# Patient Record
Sex: FEMALE | Race: WHITE | NOT HISPANIC OR LATINO | URBAN - METROPOLITAN AREA
[De-identification: names, ages, dates, MRNs, and addresses within clinical notes are randomized per-mention and may not be internally consistent; named-entity substitution may affect disease eponyms.]

---

## 2022-10-11 ENCOUNTER — ANESTHESIA EVENT (OUTPATIENT)
Dept: SURGERY | Facility: OTHER | Age: 82
End: 2022-10-11
Payer: MEDICARE

## 2022-10-11 ENCOUNTER — HOSPITAL ENCOUNTER (OUTPATIENT)
Dept: PREADMISSION TESTING | Facility: OTHER | Age: 82
Discharge: HOME | End: 2022-10-11
Attending: ORTHOPAEDIC SURGERY
Payer: MEDICARE

## 2022-10-11 VITALS
SYSTOLIC BLOOD PRESSURE: 149 MMHG | TEMPERATURE: 98 F | WEIGHT: 194 LBS | BODY MASS INDEX: 35.7 KG/M2 | RESPIRATION RATE: 20 BRPM | DIASTOLIC BLOOD PRESSURE: 73 MMHG | OXYGEN SATURATION: 94 % | HEIGHT: 62 IN | HEART RATE: 79 BPM

## 2022-10-11 DIAGNOSIS — Z01.818 PREOP TESTING: Primary | ICD-10-CM

## 2022-10-11 PROCEDURE — 93010 EKG 12-LEAD: ICD-10-PCS | Mod: ,,, | Performed by: INTERNAL MEDICINE

## 2022-10-11 PROCEDURE — 93005 ELECTROCARDIOGRAM TRACING: CPT

## 2022-10-11 PROCEDURE — 93010 ELECTROCARDIOGRAM REPORT: CPT | Mod: ,,, | Performed by: INTERNAL MEDICINE

## 2022-10-11 RX ORDER — ESCITALOPRAM OXALATE 20 MG/1
TABLET ORAL
COMMUNITY
Start: 2022-10-04

## 2022-10-11 RX ORDER — TRAMADOL HYDROCHLORIDE 50 MG/1
50 TABLET ORAL
Status: ON HOLD | COMMUNITY
Start: 2022-10-06 | End: 2022-10-19 | Stop reason: HOSPADM

## 2022-10-11 RX ORDER — LEVOTHYROXINE SODIUM 75 UG/1
TABLET ORAL
COMMUNITY
Start: 2022-09-27

## 2022-10-11 RX ORDER — MELOXICAM 15 MG/1
15 TABLET ORAL DAILY PRN
COMMUNITY
Start: 2022-09-12

## 2022-10-11 RX ORDER — TEMAZEPAM 15 MG/1
CAPSULE ORAL
COMMUNITY
Start: 2022-09-13

## 2022-10-11 RX ORDER — LOSARTAN POTASSIUM 50 MG/1
50 TABLET ORAL DAILY
COMMUNITY
Start: 2022-09-27

## 2022-10-11 RX ORDER — ATORVASTATIN CALCIUM 10 MG/1
10 TABLET, FILM COATED ORAL DAILY
COMMUNITY
Start: 2022-10-04

## 2022-10-11 NOTE — DISCHARGE INSTRUCTIONS
Information to Prepare you for your Surgery    PRE-ADMIT TESTING -  [phone]    [address] [facility]          Your surgery has been scheduled at [facility]. We are pleased to have the opportunity to serve you. For Further Information please call [phone].    On the day of surgery please report to the Information Desk on the 1st floor.    CONTACT YOUR PHYSICIAN'S OFFICE THE DAY PRIOR TO YOUR SURGERY TO OBTAIN YOUR ARRIVAL TIME.     The evening before surgery do not eat anything after 9 p.m. ( this includes hard candy, chewing gum and mints).  You may only have GATORADE, POWERADE AND WATER  from 9 p.m. until you leave your home.   DO NOT DRINK ANY LIQUIDS ON THE WAY TO THE HOSPITAL.      Why does your anesthesiologist allow you to drink Gatorade/Powerade before surgery?  Gatorade/Powerade helps to increase your comfort before surgery and to decrease your nausea after surgery. The carbohydrates in Gatorade/Powerade help reduce your body's stress response to surgery.  If you are a diabetic-drink only water prior to surgery.      Current Visitor policy(12/27/2021) - Patients may have 2 visitors pre and post procedure. Only 2 visitors will be allowed in the Surgical building with the patient.     SPECIAL MEDICATION INSTRUCTIONS: TAKE medications checked off by the Anesthesiologist on your Medication List.    Angiogram Patients: Take medications as instructed by your physician, including aspirin.     Surgery Patients:    If you take ASPIRIN - Your PHYSICIAN/SURGEON will need to inform you IF/OR when you need to stop taking aspirin prior to your surgery.     Do Not take any medications containing IBUPROFEN.    Do Not Wear any make-up (especially eye make-up) to surgery. Please remove any false eyelashes or eyelash extensions. If you arrive the day of surgery with makeup/eyelashes on you will be required to remove prior to surgery. (There is a risk of corneal  abrasions if eye makeup/eyelash extensions are not removed)      Leave all valuables at home.   Do Not wear any jewelry or watches, including any metal in body piercings. Jewelry must be removed prior to coming to the hospital.  There is a possibility that rings that are unable to be removed may be cut off if they are on the surgical extremity.    Please remove all hair extensions, wigs, clips and any other metal accessories/ ornaments from your hair.  These items may pose a flammable/fire risk in Surgery and must be removed.    Do not shave your surgical area at least 5 days prior to your surgery. The surgical prep will be performed at the hospital according to Infection Control regulations.    Contact Lens must be removed before surgery. Either do not wear the contact lens or bring a case and solution for storage.  Please bring a container for eyeglasses or dentures as required.  Bring any paperwork your physician has provided, such as consent forms,  history and physicals, doctor's orders, etc.   Bring comfortable clothes that are loose fitting to wear upon discharge. Take into consideration the type of surgery being performed.  Maintain your diet as advised per your physician the day prior to surgery.      Adequate rest the night before surgery is advised.   Park in the Parking lot behind the hospital or in the [employer] [facility] across the street from the parking lot. Parking is complimentary.  If you will be discharged the same day as your procedure, please arrange for a responsible adult to drive you home or to accompany you if traveling by taxi.   YOU WILL NOT BE PERMITTED TO DRIVE OR TO LEAVE THE HOSPITAL ALONE AFTER SURGERY.   If you are being discharged the same day, it is strongly recommended that you arrange for someone to remain with you for the first 24 hrs following your surgery.    The Surgeon will speak to your family/visitor after your surgery regarding the outcome of your surgery and post op  care.  The Surgeon may speak to you after your surgery, but there is a possibility you may not remember the details.  Please check with your family members regarding the conversation with the Surgeon.    We strongly recommend whoever is bringing you home be present for discharge instructions.  This will ensure a thorough understanding for your post op home care.    ALL CHILDREN MUST ALWAYS BE ACCOMPANIED BY AN ADULT.    Visitors-Refer to current Visitor policy handouts.    Thank you for your cooperation.  The Staff of [facility].            Bathing Instructions with Hibiclens    Shower the evening before and morning of your procedure with Hibiclens:  Wash your face with water and your regular face wash/soap  Apply Hibiclens directly on your skin or on a wet washcloth and wash gently. When showering: Move away from the shower stream when applying Hibiclens to avoid rinsing off too soon.  Rinse thoroughly with warm water  Do not dilute Hibiclens        Dry off as usual, do not use any deodorant, powder, body lotions, perfume, after shave or cologne.

## 2022-10-11 NOTE — ANESTHESIA PREPROCEDURE EVALUATION
10/11/2022  Sun Jones is a 82 y.o., female.      Pre-op Assessment    I have reviewed the Patient Summary Reports.     I have reviewed the Nursing Notes. I have reviewed the NPO Status.   I have reviewed the Medications.     Review of Systems  Anesthesia Hx:  Denies Family Hx of Anesthesia complications.   Denies Personal Hx of Anesthesia complications.   Social:  Non-Smoker    Hematology/Oncology:     Oncology Normal    -- Anemia:   EENT/Dental:EENT/Dental Normal   Cardiovascular:   Hypertension hyperlipidemia    Pulmonary:  Pulmonary Normal    Renal/:   Chronic Renal Disease, CKD    Hepatic/GI:  Hepatic/GI Normal    Musculoskeletal:   Arthritis     Neurological:   Frequent falls, trips not syncope   Endocrine:   Hypothyroidism  Obesity / BMI > 30  Dermatological:  Skin Normal    Psych:  Psychiatric Normal           Physical Exam  General: Cooperative, Alert and Oriented    Airway:  Mallampati: III   Mouth Opening: Normal  TM Distance: Normal  Neck ROM: Normal ROM    Dental:A lot of dental issues, healing post upper L, temp bridge lower L, multiple missing teeth, none loose, upper front perm bridge      Anesthesia Plan  Type of Anesthesia, risks & benefits discussed:    Anesthesia Type: Gen ETT  Intra-op Monitoring Plan: Standard ASA Monitors  Post Op Pain Control Plan: multimodal analgesia, IV/PO Opioids PRN and peripheral nerve block  Induction:  IV  Airway Plan: Video  Informed Consent: Informed consent signed with the Patient and all parties understand the risks and agree with anesthesia plan.  All questions answered.   ASA Score: 3  Anesthesia Plan Notes: Labs on paper chart from [facility] notable for eGFR 47, hb 10.7  EKG today    Ready For Surgery From Anesthesia Perspective.     .

## 2022-10-18 ENCOUNTER — HOSPITAL ENCOUNTER (OUTPATIENT)
Facility: OTHER | Age: 82
Discharge: HOME HEALTH | End: 2022-10-19
Attending: ORTHOPAEDIC SURGERY | Admitting: ORTHOPAEDIC SURGERY
Payer: MEDICARE

## 2022-10-18 ENCOUNTER — ANESTHESIA (OUTPATIENT)
Dept: SURGERY | Facility: OTHER | Age: 82
End: 2022-10-18
Payer: MEDICARE

## 2022-10-18 DIAGNOSIS — S42.92XA FRACTURE, SHOULDER, LEFT, CLOSED, INITIAL ENCOUNTER: ICD-10-CM

## 2022-10-18 DIAGNOSIS — M19.012 PRIMARY OSTEOARTHRITIS OF LEFT SHOULDER: Primary | ICD-10-CM

## 2022-10-18 LAB
ABO + RH BLD: NORMAL
BASOPHILS # BLD AUTO: 0.02 K/UL (ref 0–0.2)
BASOPHILS NFR BLD: 0.3 % (ref 0–1.9)
BLD GP AB SCN CELLS X3 SERPL QL: NORMAL
BLD PROD TYP BPU: NORMAL
BLOOD UNIT EXPIRATION DATE: NORMAL
BLOOD UNIT TYPE CODE: 6200
BLOOD UNIT TYPE: NORMAL
CODING SYSTEM: NORMAL
DIFFERENTIAL METHOD: ABNORMAL
DISPENSE STATUS: NORMAL
EOSINOPHIL # BLD AUTO: 0.1 K/UL (ref 0–0.5)
EOSINOPHIL NFR BLD: 1.9 % (ref 0–8)
ERYTHROCYTE [DISTWIDTH] IN BLOOD BY AUTOMATED COUNT: 16.2 % (ref 11.5–14.5)
HCT VFR BLD AUTO: 30.8 % (ref 37–48.5)
HGB BLD-MCNC: 10.2 G/DL (ref 12–16)
IMM GRANULOCYTES # BLD AUTO: 0.02 K/UL (ref 0–0.04)
IMM GRANULOCYTES NFR BLD AUTO: 0.3 % (ref 0–0.5)
LYMPHOCYTES # BLD AUTO: 1.2 K/UL (ref 1–4.8)
LYMPHOCYTES NFR BLD: 17.6 % (ref 18–48)
MCH RBC QN AUTO: 29.6 PG (ref 27–31)
MCHC RBC AUTO-ENTMCNC: 33.1 G/DL (ref 32–36)
MCV RBC AUTO: 89 FL (ref 82–98)
MONOCYTES # BLD AUTO: 0.5 K/UL (ref 0.3–1)
MONOCYTES NFR BLD: 7.1 % (ref 4–15)
NEUTROPHILS # BLD AUTO: 5 K/UL (ref 1.8–7.7)
NEUTROPHILS NFR BLD: 72.8 % (ref 38–73)
NRBC BLD-RTO: 0 /100 WBC
NUM UNITS TRANS PACKED RBC: NORMAL
PLATELET # BLD AUTO: 241 K/UL (ref 150–450)
PMV BLD AUTO: 9.1 FL (ref 9.2–12.9)
RBC # BLD AUTO: 3.45 M/UL (ref 4–5.4)
WBC # BLD AUTO: 6.86 K/UL (ref 3.9–12.7)

## 2022-10-18 PROCEDURE — P9045 ALBUMIN (HUMAN), 5%, 250 ML: HCPCS | Mod: JG | Performed by: STUDENT IN AN ORGANIZED HEALTH CARE EDUCATION/TRAINING PROGRAM

## 2022-10-18 PROCEDURE — 94799 UNLISTED PULMONARY SVC/PX: CPT

## 2022-10-18 PROCEDURE — 86901 BLOOD TYPING SEROLOGIC RH(D): CPT | Performed by: ORTHOPAEDIC SURGERY

## 2022-10-18 PROCEDURE — 97535 SELF CARE MNGMENT TRAINING: CPT

## 2022-10-18 PROCEDURE — 37000008 HC ANESTHESIA 1ST 15 MINUTES: Performed by: ORTHOPAEDIC SURGERY

## 2022-10-18 PROCEDURE — 27201423 OPTIME MED/SURG SUP & DEVICES STERILE SUPPLY: Performed by: ORTHOPAEDIC SURGERY

## 2022-10-18 PROCEDURE — 63600175 PHARM REV CODE 636 W HCPCS: Performed by: ANESTHESIOLOGY

## 2022-10-18 PROCEDURE — 63600175 PHARM REV CODE 636 W HCPCS: Performed by: ORTHOPAEDIC SURGERY

## 2022-10-18 PROCEDURE — 25000003 PHARM REV CODE 250: Performed by: ORTHOPAEDIC SURGERY

## 2022-10-18 PROCEDURE — 36000711: Performed by: ORTHOPAEDIC SURGERY

## 2022-10-18 PROCEDURE — 25000003 PHARM REV CODE 250: Performed by: ANESTHESIOLOGY

## 2022-10-18 PROCEDURE — 76942 ECHO GUIDE FOR BIOPSY: CPT | Performed by: ANESTHESIOLOGY

## 2022-10-18 PROCEDURE — 37000009 HC ANESTHESIA EA ADD 15 MINS: Performed by: ORTHOPAEDIC SURGERY

## 2022-10-18 PROCEDURE — 71000039 HC RECOVERY, EACH ADD'L HOUR: Performed by: ORTHOPAEDIC SURGERY

## 2022-10-18 PROCEDURE — 97165 OT EVAL LOW COMPLEX 30 MIN: CPT

## 2022-10-18 PROCEDURE — 71000033 HC RECOVERY, INTIAL HOUR: Performed by: ORTHOPAEDIC SURGERY

## 2022-10-18 PROCEDURE — P9016 RBC LEUKOCYTES REDUCED: HCPCS | Performed by: ANESTHESIOLOGY

## 2022-10-18 PROCEDURE — 36000710: Performed by: ORTHOPAEDIC SURGERY

## 2022-10-18 PROCEDURE — 94761 N-INVAS EAR/PLS OXIMETRY MLT: CPT | Mod: 59

## 2022-10-18 PROCEDURE — 97110 THERAPEUTIC EXERCISES: CPT

## 2022-10-18 PROCEDURE — 25000003 PHARM REV CODE 250: Performed by: STUDENT IN AN ORGANIZED HEALTH CARE EDUCATION/TRAINING PROGRAM

## 2022-10-18 PROCEDURE — C1729 CATH, DRAINAGE: HCPCS | Performed by: ORTHOPAEDIC SURGERY

## 2022-10-18 PROCEDURE — 86920 COMPATIBILITY TEST SPIN: CPT | Performed by: ANESTHESIOLOGY

## 2022-10-18 PROCEDURE — 99900035 HC TECH TIME PER 15 MIN (STAT)

## 2022-10-18 PROCEDURE — C1776 JOINT DEVICE (IMPLANTABLE): HCPCS | Performed by: ORTHOPAEDIC SURGERY

## 2022-10-18 PROCEDURE — C9290 INJ, BUPIVACAINE LIPOSOME: HCPCS | Performed by: ORTHOPAEDIC SURGERY

## 2022-10-18 PROCEDURE — 63600175 PHARM REV CODE 636 W HCPCS: Performed by: STUDENT IN AN ORGANIZED HEALTH CARE EDUCATION/TRAINING PROGRAM

## 2022-10-18 PROCEDURE — 85025 COMPLETE CBC W/AUTO DIFF WBC: CPT | Performed by: ORTHOPAEDIC SURGERY

## 2022-10-18 PROCEDURE — 36430 TRANSFUSION BLD/BLD COMPNT: CPT | Mod: 59

## 2022-10-18 PROCEDURE — C1713 ANCHOR/SCREW BN/BN,TIS/BN: HCPCS | Performed by: ORTHOPAEDIC SURGERY

## 2022-10-18 PROCEDURE — 36415 COLL VENOUS BLD VENIPUNCTURE: CPT | Performed by: ORTHOPAEDIC SURGERY

## 2022-10-18 DEVICE — SCREW BONE RSP 5 X 22MM TITAN: Type: IMPLANTABLE DEVICE | Site: SHOULDER | Status: FUNCTIONAL

## 2022-10-18 DEVICE — IMPLANTABLE DEVICE: Type: IMPLANTABLE DEVICE | Site: SHOULDER | Status: FUNCTIONAL

## 2022-10-18 DEVICE — HEAD GLENOID RSP 4MM OFFSET: Type: IMPLANTABLE DEVICE | Site: SHOULDER | Status: FUNCTIONAL

## 2022-10-18 DEVICE — SCREW BONE RSP 6.5X18 GLENOID: Type: IMPLANTABLE DEVICE | Site: SHOULDER | Status: FUNCTIONAL

## 2022-10-18 DEVICE — SCREW BONE LOCK 5X30MM TI: Type: IMPLANTABLE DEVICE | Site: SHOULDER | Status: FUNCTIONAL

## 2022-10-18 DEVICE — INSERT SOCKET E+ SM +4 SZ32: Type: IMPLANTABLE DEVICE | Site: SHOULDER | Status: FUNCTIONAL

## 2022-10-18 DEVICE — SCREW RSP GLENOID BP 5X14MM: Type: IMPLANTABLE DEVICE | Site: SHOULDER | Status: FUNCTIONAL

## 2022-10-18 DEVICE — BASEPLATE GLENOID REV RSP P2: Type: IMPLANTABLE DEVICE | Site: SHOULDER | Status: FUNCTIONAL

## 2022-10-18 RX ORDER — FENTANYL CITRATE 50 UG/ML
INJECTION, SOLUTION INTRAMUSCULAR; INTRAVENOUS
Status: DISCONTINUED | OUTPATIENT
Start: 2022-10-18 | End: 2022-10-18

## 2022-10-18 RX ORDER — DEXAMETHASONE SODIUM PHOSPHATE 4 MG/ML
INJECTION, SOLUTION INTRA-ARTICULAR; INTRALESIONAL; INTRAMUSCULAR; INTRAVENOUS; SOFT TISSUE
Status: DISCONTINUED | OUTPATIENT
Start: 2022-10-18 | End: 2022-10-18

## 2022-10-18 RX ORDER — ONDANSETRON 2 MG/ML
INJECTION INTRAMUSCULAR; INTRAVENOUS
Status: DISCONTINUED | OUTPATIENT
Start: 2022-10-18 | End: 2022-10-18

## 2022-10-18 RX ORDER — LOSARTAN POTASSIUM 50 MG/1
50 TABLET ORAL DAILY
Status: DISCONTINUED | OUTPATIENT
Start: 2022-10-19 | End: 2022-10-19

## 2022-10-18 RX ORDER — ACETAMINOPHEN 500 MG
1000 TABLET ORAL EVERY 6 HOURS
Status: DISCONTINUED | OUTPATIENT
Start: 2022-10-18 | End: 2022-10-19 | Stop reason: HOSPADM

## 2022-10-18 RX ORDER — ACETAMINOPHEN 500 MG
1000 TABLET ORAL
Status: COMPLETED | OUTPATIENT
Start: 2022-10-18 | End: 2022-10-18

## 2022-10-18 RX ORDER — MUPIROCIN 20 MG/G
OINTMENT TOPICAL
Status: DISCONTINUED | OUTPATIENT
Start: 2022-10-18 | End: 2022-10-18 | Stop reason: HOSPADM

## 2022-10-18 RX ORDER — LIDOCAINE HYDROCHLORIDE 20 MG/ML
INJECTION INTRAVENOUS
Status: DISCONTINUED | OUTPATIENT
Start: 2022-10-18 | End: 2022-10-18

## 2022-10-18 RX ORDER — FAMOTIDINE 20 MG/1
20 TABLET, FILM COATED ORAL 2 TIMES DAILY
Status: DISCONTINUED | OUTPATIENT
Start: 2022-10-18 | End: 2022-10-19 | Stop reason: HOSPADM

## 2022-10-18 RX ORDER — HYDROMORPHONE HYDROCHLORIDE 2 MG/ML
0.4 INJECTION, SOLUTION INTRAMUSCULAR; INTRAVENOUS; SUBCUTANEOUS EVERY 5 MIN PRN
Status: DISCONTINUED | OUTPATIENT
Start: 2022-10-18 | End: 2022-10-18 | Stop reason: HOSPADM

## 2022-10-18 RX ORDER — LEVOTHYROXINE SODIUM 75 UG/1
75 TABLET ORAL
Status: DISCONTINUED | OUTPATIENT
Start: 2022-10-19 | End: 2022-10-19 | Stop reason: HOSPADM

## 2022-10-18 RX ORDER — PROCHLORPERAZINE EDISYLATE 5 MG/ML
5 INJECTION INTRAMUSCULAR; INTRAVENOUS EVERY 6 HOURS PRN
Status: DISCONTINUED | OUTPATIENT
Start: 2022-10-18 | End: 2022-10-19 | Stop reason: HOSPADM

## 2022-10-18 RX ORDER — SODIUM CHLORIDE, SODIUM LACTATE, POTASSIUM CHLORIDE, CALCIUM CHLORIDE 600; 310; 30; 20 MG/100ML; MG/100ML; MG/100ML; MG/100ML
INJECTION, SOLUTION INTRAVENOUS CONTINUOUS
Status: DISCONTINUED | OUTPATIENT
Start: 2022-10-18 | End: 2022-10-19 | Stop reason: HOSPADM

## 2022-10-18 RX ORDER — AMOXICILLIN 250 MG
1 CAPSULE ORAL 2 TIMES DAILY
Status: DISCONTINUED | OUTPATIENT
Start: 2022-10-18 | End: 2022-10-19 | Stop reason: HOSPADM

## 2022-10-18 RX ORDER — ROPIVACAINE HYDROCHLORIDE 5 MG/ML
INJECTION, SOLUTION EPIDURAL; INFILTRATION; PERINEURAL
Status: COMPLETED | OUTPATIENT
Start: 2022-10-18 | End: 2022-10-18

## 2022-10-18 RX ORDER — CEFAZOLIN SODIUM 2 G/50ML
2 SOLUTION INTRAVENOUS
Status: COMPLETED | OUTPATIENT
Start: 2022-10-18 | End: 2022-10-19

## 2022-10-18 RX ORDER — SODIUM CHLORIDE 0.9 % (FLUSH) 0.9 %
10 SYRINGE (ML) INJECTION
Status: DISCONTINUED | OUTPATIENT
Start: 2022-10-18 | End: 2022-10-19 | Stop reason: HOSPADM

## 2022-10-18 RX ORDER — MUPIROCIN 20 MG/G
1 OINTMENT TOPICAL 2 TIMES DAILY
Status: DISCONTINUED | OUTPATIENT
Start: 2022-10-18 | End: 2022-10-19 | Stop reason: HOSPADM

## 2022-10-18 RX ORDER — PROCHLORPERAZINE EDISYLATE 5 MG/ML
5 INJECTION INTRAMUSCULAR; INTRAVENOUS EVERY 30 MIN PRN
Status: DISCONTINUED | OUTPATIENT
Start: 2022-10-18 | End: 2022-10-18 | Stop reason: HOSPADM

## 2022-10-18 RX ORDER — CELECOXIB 200 MG/1
400 CAPSULE ORAL
Status: COMPLETED | OUTPATIENT
Start: 2022-10-18 | End: 2022-10-18

## 2022-10-18 RX ORDER — OXYCODONE HYDROCHLORIDE 5 MG/1
5 TABLET ORAL
Status: DISCONTINUED | OUTPATIENT
Start: 2022-10-18 | End: 2022-10-18 | Stop reason: HOSPADM

## 2022-10-18 RX ORDER — PROPOFOL 10 MG/ML
VIAL (ML) INTRAVENOUS
Status: DISCONTINUED | OUTPATIENT
Start: 2022-10-18 | End: 2022-10-18

## 2022-10-18 RX ORDER — ROCURONIUM BROMIDE 10 MG/ML
INJECTION, SOLUTION INTRAVENOUS
Status: DISCONTINUED | OUTPATIENT
Start: 2022-10-18 | End: 2022-10-18

## 2022-10-18 RX ORDER — SODIUM CHLORIDE 0.9 % (FLUSH) 0.9 %
3 SYRINGE (ML) INJECTION
Status: DISCONTINUED | OUTPATIENT
Start: 2022-10-18 | End: 2022-10-18 | Stop reason: HOSPADM

## 2022-10-18 RX ORDER — CEFAZOLIN SODIUM 1 G/3ML
2 INJECTION, POWDER, FOR SOLUTION INTRAMUSCULAR; INTRAVENOUS
Status: COMPLETED | OUTPATIENT
Start: 2022-10-18 | End: 2022-10-18

## 2022-10-18 RX ORDER — ATORVASTATIN CALCIUM 10 MG/1
10 TABLET, FILM COATED ORAL DAILY
Status: DISCONTINUED | OUTPATIENT
Start: 2022-10-19 | End: 2022-10-19 | Stop reason: HOSPADM

## 2022-10-18 RX ORDER — HYDROCODONE BITARTRATE AND ACETAMINOPHEN 10; 325 MG/1; MG/1
1 TABLET ORAL EVERY 4 HOURS PRN
Status: DISCONTINUED | OUTPATIENT
Start: 2022-10-18 | End: 2022-10-19 | Stop reason: HOSPADM

## 2022-10-18 RX ORDER — DIPHENHYDRAMINE HYDROCHLORIDE 50 MG/ML
25 INJECTION INTRAMUSCULAR; INTRAVENOUS EVERY 6 HOURS PRN
Status: DISCONTINUED | OUTPATIENT
Start: 2022-10-18 | End: 2022-10-18 | Stop reason: HOSPADM

## 2022-10-18 RX ORDER — ONDANSETRON 8 MG/1
8 TABLET, ORALLY DISINTEGRATING ORAL EVERY 8 HOURS PRN
Status: DISCONTINUED | OUTPATIENT
Start: 2022-10-18 | End: 2022-10-19 | Stop reason: HOSPADM

## 2022-10-18 RX ORDER — POLYETHYLENE GLYCOL 3350 17 G/17G
17 POWDER, FOR SOLUTION ORAL DAILY
Status: DISCONTINUED | OUTPATIENT
Start: 2022-10-18 | End: 2022-10-19 | Stop reason: HOSPADM

## 2022-10-18 RX ORDER — TRANEXAMIC ACID 100 MG/ML
1000 INJECTION, SOLUTION INTRAVENOUS
Status: COMPLETED | OUTPATIENT
Start: 2022-10-18 | End: 2022-10-18

## 2022-10-18 RX ORDER — DEXTROSE MONOHYDRATE AND SODIUM CHLORIDE 5; .9 G/100ML; G/100ML
INJECTION, SOLUTION INTRAVENOUS CONTINUOUS
Status: DISCONTINUED | OUTPATIENT
Start: 2022-10-18 | End: 2022-10-19 | Stop reason: HOSPADM

## 2022-10-18 RX ORDER — TRANEXAMIC ACID 100 MG/ML
1000 INJECTION, SOLUTION INTRAVENOUS
Status: DISCONTINUED | OUTPATIENT
Start: 2022-10-18 | End: 2022-10-18

## 2022-10-18 RX ORDER — LIDOCAINE HYDROCHLORIDE 10 MG/ML
0.5 INJECTION, SOLUTION EPIDURAL; INFILTRATION; INTRACAUDAL; PERINEURAL ONCE
Status: DISCONTINUED | OUTPATIENT
Start: 2022-10-18 | End: 2022-10-18 | Stop reason: HOSPADM

## 2022-10-18 RX ORDER — HYDROCODONE BITARTRATE AND ACETAMINOPHEN 5; 325 MG/1; MG/1
1 TABLET ORAL EVERY 4 HOURS PRN
Status: DISCONTINUED | OUTPATIENT
Start: 2022-10-18 | End: 2022-10-19 | Stop reason: HOSPADM

## 2022-10-18 RX ORDER — ESCITALOPRAM OXALATE 10 MG/1
20 TABLET ORAL DAILY
Status: DISCONTINUED | OUTPATIENT
Start: 2022-10-19 | End: 2022-10-19 | Stop reason: HOSPADM

## 2022-10-18 RX ORDER — ALBUMIN HUMAN 50 G/1000ML
SOLUTION INTRAVENOUS CONTINUOUS PRN
Status: DISCONTINUED | OUTPATIENT
Start: 2022-10-18 | End: 2022-10-18

## 2022-10-18 RX ORDER — MEPERIDINE HYDROCHLORIDE 25 MG/ML
12.5 INJECTION INTRAMUSCULAR; INTRAVENOUS; SUBCUTANEOUS ONCE AS NEEDED
Status: DISCONTINUED | OUTPATIENT
Start: 2022-10-18 | End: 2022-10-18 | Stop reason: HOSPADM

## 2022-10-18 RX ADMIN — ROCURONIUM BROMIDE 50 MG: 10 SOLUTION INTRAVENOUS at 11:10

## 2022-10-18 RX ADMIN — CELECOXIB 400 MG: 200 CAPSULE ORAL at 10:10

## 2022-10-18 RX ADMIN — FAMOTIDINE 20 MG: 20 TABLET ORAL at 09:10

## 2022-10-18 RX ADMIN — CEFAZOLIN 2 G: 330 INJECTION, POWDER, FOR SOLUTION INTRAMUSCULAR; INTRAVENOUS at 11:10

## 2022-10-18 RX ADMIN — ROPIVACAINE HYDROCHLORIDE 15 ML: 5 INJECTION, SOLUTION EPIDURAL; INFILTRATION; PERINEURAL at 11:10

## 2022-10-18 RX ADMIN — FENTANYL CITRATE 50 MCG: 50 INJECTION, SOLUTION INTRAMUSCULAR; INTRAVENOUS at 11:10

## 2022-10-18 RX ADMIN — ACETAMINOPHEN 1000 MG: 500 TABLET ORAL at 09:10

## 2022-10-18 RX ADMIN — SODIUM CHLORIDE: 0.9 INJECTION, SOLUTION INTRAVENOUS at 12:10

## 2022-10-18 RX ADMIN — GLYCOPYRROLATE 0.2 MG: 0.2 INJECTION, SOLUTION INTRAMUSCULAR; INTRAVITREAL at 12:10

## 2022-10-18 RX ADMIN — MUPIROCIN 1 G: 20 OINTMENT TOPICAL at 09:10

## 2022-10-18 RX ADMIN — ONDANSETRON HYDROCHLORIDE 4 MG: 2 INJECTION INTRAMUSCULAR; INTRAVENOUS at 11:10

## 2022-10-18 RX ADMIN — DEXAMETHASONE SODIUM PHOSPHATE 8 MG: 4 INJECTION, SOLUTION INTRAMUSCULAR; INTRAVENOUS at 11:10

## 2022-10-18 RX ADMIN — LIDOCAINE HYDROCHLORIDE 100 MG: 20 INJECTION, SOLUTION INTRAVENOUS at 11:10

## 2022-10-18 RX ADMIN — PROPOFOL 120 MG: 10 INJECTION, EMULSION INTRAVENOUS at 11:10

## 2022-10-18 RX ADMIN — ROCURONIUM BROMIDE 50 MG: 10 SOLUTION INTRAVENOUS at 12:10

## 2022-10-18 RX ADMIN — TRANEXAMIC ACID 1000 MG: 100 INJECTION, SOLUTION INTRAVENOUS at 12:10

## 2022-10-18 RX ADMIN — SENNOSIDES AND DOCUSATE SODIUM 1 TABLET: 50; 8.6 TABLET ORAL at 09:10

## 2022-10-18 RX ADMIN — CEFAZOLIN SODIUM 2 G: 2 SOLUTION INTRAVENOUS at 09:10

## 2022-10-18 RX ADMIN — PROPOFOL 100 MG: 10 INJECTION, EMULSION INTRAVENOUS at 11:10

## 2022-10-18 RX ADMIN — ALBUMIN (HUMAN): 12.5 SOLUTION INTRAVENOUS at 11:10

## 2022-10-18 RX ADMIN — TRANEXAMIC ACID 1000 MG: 100 INJECTION, SOLUTION INTRAVENOUS at 01:10

## 2022-10-18 RX ADMIN — OXYCODONE 5 MG: 5 TABLET ORAL at 02:10

## 2022-10-18 RX ADMIN — POLYETHYLENE GLYCOL 3350 17 G: 17 POWDER, FOR SOLUTION ORAL at 09:10

## 2022-10-18 RX ADMIN — DEXTROSE AND SODIUM CHLORIDE: 5; .9 INJECTION, SOLUTION INTRAVENOUS at 06:10

## 2022-10-18 RX ADMIN — HYDROCODONE BITARTRATE AND ACETAMINOPHEN 1 TABLET: 10; 325 TABLET ORAL at 09:10

## 2022-10-18 RX ADMIN — SODIUM CHLORIDE, SODIUM LACTATE, POTASSIUM CHLORIDE, AND CALCIUM CHLORIDE: 600; 310; 30; 20 INJECTION, SOLUTION INTRAVENOUS at 11:10

## 2022-10-18 RX ADMIN — ACETAMINOPHEN 1000 MG: 500 TABLET, FILM COATED ORAL at 10:10

## 2022-10-18 RX ADMIN — PHENYLEPHRINE HYDROCHLORIDE 0.4 MCG/KG/MIN: 10 INJECTION INTRAVENOUS at 11:10

## 2022-10-18 RX ADMIN — MUPIROCIN: 20 OINTMENT TOPICAL at 10:10

## 2022-10-18 RX ADMIN — PROPOFOL 50 MG: 10 INJECTION, EMULSION INTRAVENOUS at 01:10

## 2022-10-18 RX ADMIN — SUGAMMADEX 200 MG: 100 INJECTION, SOLUTION INTRAVENOUS at 01:10

## 2022-10-18 RX ADMIN — ALBUMIN (HUMAN): 12.5 SOLUTION INTRAVENOUS at 12:10

## 2022-10-18 NOTE — BRIEF OP NOTE
[facility] ([facility]   Operative Note     SUMMARY     Surgery Date: 10/18/2022     Surgeon(s) and Role:     * Claude S. Williams IV, MD - Primary    Assisting Surgeon: None    Pre-op Diagnosis:  Primary osteoarthritis of left shoulder [M19.012]  Left shoulder proximal humerus displaced fracture    Post-op Diagnosis:  same    Procedure(s) (LRB):  ARTHROPLASTY, SHOULDER, TOTAL, REVERSE / LEFT (Left)    PROCEDURE IN DETAIL:  After proper informed consent was obtained, the patient   underwent interscalene block to the left upper extremity per the   anesthesiologist without difficulty.  The patient was then transported to the   Operating Suite and underwent general endotracheal anesthesia without   complication.  The patient was placed into a well-padded beach chair position   and all bony prominences were well padded.  The left upper extremity was thoroughly prepped with alcohol,   ChloraPrep and draped in the usual sterile fashion.  Preoperative antibiotics   were administered.  Routine preoperative timeout was taken and the operative   site was positively identified by the operative team.  An incision was then made   over the anterior aspect of the left shoulder and careful dissection was carried down through the subdermal tissue using Bovie cautery for hemostasis.  A self-retaining retractor was   placed within the deltopectoral interval and the conjoined tendon was retracted   exposing the proximal humerus,.  There was a comminuted fracture of the proximal humerus with complete displacement of the humeral head articular surface.   The greater tuberosities were identified and tagged with 2. Ethibond suture.  The humeral articular surface fracture fragment was then removed and the axillary nerve was protected throughout the procedure.  The glenoid was easily exposed.   A center pin was placed using the guide   and the pin was placed in the appropriate position.  The pin was measured and the tap was then placed  along this trajectory and the bone appeared very stable.  A light reaming of the   glenoid was then performed, which allowed for a small amount of bleeding bone at   the inferior aspect of the glenoid.  The tap was then removed and the   glenosphere base plate 30 mm based on depth gauge of the pin   was selected.  The baseplate had excellent purchase and affixed to the scapula   securely.  The four peripheral locking screws for the baseplate were each   drilled, measured and placed securely.  Once secure fixation was achieved, the    glenosphere was selected and placed on the baseplate and tapped on   the [place] taper and secured with the center screw.  Attention was then turned to   the proximal humerus and serial reaming and broaching was then carried out to a size 12.    The  DJO humeral press-fit stem for reverse was then selected 12 mm   after broaching and this was impacted and had excellent purchase.  Reaming of   the cup was carried out prior to impaction of the final component.  A trial was then   placed and the shoulder was reduced.  This did appear to reduce nicely and had   excellent range of motion; however, there was minimal tension on the reduction   and the articulation was able to piston slightly.  Therefore, a standard +4 was   then trialled.  This had nice reduction with better tension and again had   excellent elevation, external and internal rotation without any liftoff with   smooth stable range of motion.  This was then dislocated and the final humeral   socket polyethylene size 32 standard neutral +4 was impacted in place.  This was a   vitamin E polyethylene insert and on reduction again had excellent tension on   the reduction and appeared to have smooth, full stable motion, elevation,   external and internal rotation.  The tuberosities were then repaired using the 2. Ethibond suture in a figure-eight interrupted suture through the component proximal humerus holes.  There was no apparent  inferior impingement.    The component cleared the acromion well with elevation and rotation.  There   appeared to have appropriate tension on the deltoid.   The wound was irrigated and meticulous hemostasis was again confirmed.  The wound was then   closed in layers with Vicryl and Monocryl subcuticular suture and sterile skin   staples over a medium Hemovac drain.  A sterile soft dressing was applied and   the patient was placed into an abduction pillow sling.  She was awakened in the   Operating Suite without complication and taken to the recovery area in stable   condition.  She tolerated the procedure very well.  Lap, instrument and needle   counts were correct.       Anesthesia: General    Description of the findings of the procedure: left shoulder proximal humerus displaced fracture    Findings/Key Components: as above. Degenerative joint disease    Estimated Blood Loss: 200 mL         Specimens:   Specimen (24h ago, onward)      None            Discharge Note    SUMMARY     Admit Date: 10/18/2022    Discharge Date and Time:  10/19/2022 8:03 AM    Hospital Course (synopsis of major diagnoses, care, treatment, and services provided during the course of the hospital stay):  Postoperative day the patient is comfortable, alert and appropriate.  Left shoulder dressing is intact clean and dry.  Drain was removed without difficulty.  Neurological examination is intact.  Intact circulation.      Final Diagnosis: Post-Op Diagnosis Codes:     * Primary osteoarthritis of left shoulder [M19.012]    [place] [facility]    Follow Up/Patient Instructions:     Medications:  Reconciled Home Medications:      Medication List        ASK your doctor about these medications      atorvastatin 10 MG tablet  Commonly known as: LIPITOR  Take 10 mg by mouth once daily.     EScitalopram oxalate 20 MG tablet  Commonly known as: LEXAPRO  TAKE 1 TABLET BY MOUTH EVERY DAY FOR MOOD OR ANXIETY     levothyroxine 75 MCG  tablet  Commonly known as: SYNTHROID  TAKE 1 TABLET BY MOUTH EVERY DAY ON AN EMPTY STOMACH FOR THYROID     losartan 50 MG tablet  Commonly known as: COZAAR  Take 50 mg by mouth once daily.     meloxicam 15 MG tablet  Commonly known as: MOBIC  Take 15 mg by mouth daily as needed.     temazepam 15 mg Cap  Commonly known as: RESTORIL  TAKE 2 CAPSULES BY MOUTH AT BEDTIME AS NEEDED     traMADoL 50 mg tablet  Commonly known as: ULTRAM  Take 50 mg by mouth every 6 to 8 hours as needed.            No discharge procedures on file.

## 2022-10-18 NOTE — PLAN OF CARE
Problem: Occupational Therapy  Goal: Occupational Therapy Goal  Description: Goals to be met by: 10/20/22    Patient will increase functional independence with ADLs by performing:       Pt and caregiver will verbalize understanding of pt's left UE precautions and how to adhere to pt's precautions during ADL and ADL mobility.    Pt and caregiver will verbalize and demonstrate understanding of how cryotherapy fits on pt's left UE acknowledging precautions and purpose of cryotherapy.     Pt and caregiver will verbalize and demonstrate understanding of correct positioning of pt's left UE in immobilizer and how to position left UE when lying in bed/sitting in chair with immobilizer donned.    Pt and caregiver will verbalize and demonstrate understanding of pt's left UE HEP adhering to precautions.  Pt and caregiver will verbalize and demonstrate understanding of safety strategies to reduce pt fall risk during ADL and ADL mobility.         Outcome: Ongoing, Progressing   Evaluation complete and goals established.  Pt receptive to all education and training on left shoulder precautions, correct fit of immobilizer and cryotherapy, and left UE HEP but no caregiver present for training.  Pt is needing assist with all ADL and ADL mobility at this time.  Pt reporting she will have assist from her  and daughter when discharged to home.

## 2022-10-18 NOTE — ANESTHESIA PROCEDURE NOTES
L:eft superior trunk    Patient location during procedure: holding area   Block not for primary anesthetic.  Reason for block: at surgeon's request and post-op pain management   Post-op Pain Location: left shoulder   Timeout: 10/18/2022 11:16 AM   End time: 10/18/2022 11:25 AM    Staffing  Authorizing Provider: Danny Moreau MD  Performing Provider: Danny Moreau MD    Preanesthetic Checklist  Completed: patient identified, IV checked, site marked, risks and benefits discussed, surgical consent, monitors and equipment checked, pre-op evaluation and timeout performed  Peripheral Block  Patient position: right lateral decubitus  Prep: ChloraPrep  Patient monitoring: heart rate, cardiac monitor, continuous pulse ox and frequent blood pressure checks  Block type: Sup Trunk  Laterality: left  Injection technique: single shot  Needle  Needle type: Echogenic   Needle gauge: 20 G  Needle length: 4 in  Needle localization: ultrasound guidance and anatomical landmarks   -ultrasound image captured on disc.  Assessment  Injection assessment: negative aspiration and negative parasthesia  Paresthesia pain: none  Heart rate change: no  Slow fractionated injection: yes  Pain Tolerance: comfortable throughout block and no complaints  Medications:    Medications: ropivacaine (NAROPIN) injection 0.5% - Perineural   15 mL - 10/18/2022 11:25:00 AM

## 2022-10-18 NOTE — TRANSFER OF CARE
"Anesthesia Transfer of Care Note    Patient: Sun Jones    Procedure(s) Performed: Procedure(s) (LRB):  ARTHROPLASTY, SHOULDER, TOTAL, REVERSE / LEFT (Left)    Patient location: PACU    Anesthesia Type: general    Transport from OR: Transported from OR on 6-10 L/min O2 by face mask with adequate spontaneous ventilation    Post pain: adequate analgesia    Post assessment: no apparent anesthetic complications and tolerated procedure well    Post vital signs: stable    Level of consciousness: responds to stimulation and sedated    Nausea/Vomiting: no nausea/vomiting    Complications: none    Transfer of care protocol was followed      Last vitals:   Visit Vitals  /63 (BP Location: Right arm, Patient Position: Lying)   Pulse 62   Temp 35.8 °C (96.4 °F) (Axillary)   Resp 16   Ht 5' 2" (1.575 m)   Wt 88 kg (194 lb)   SpO2 96%   Breastfeeding No   BMI 35.48 kg/m²     "

## 2022-10-18 NOTE — ANESTHESIA PROCEDURE NOTES
Intubation    Date/Time: 10/18/2022 11:46 AM  Performed by: Gregorio Christy CRNA  Authorized by: Jamari Khan MD     Intubation:     Induction:  Intravenous    Intubated:  Postinduction    Mask Ventilation:  Easy with oral airway    Attempts:  1    Attempted By:  CRNA    Method of Intubation:  Video laryngoscopy    Blade:  Drake 3    Laryngeal View Grade: Grade I - full view of cords      Difficult Airway Encountered?: No      Complications:  None    Airway Device:  Oral endotracheal tube    Airway Device Size:  7.5    Style/Cuff Inflation:  Cuffed (inflated to minimal occlusive pressure)    Tube secured:  21    Secured at:  The teeth    Placement Verified By:  Capnometry and Revisualization with laryngoscopy    Complicating Factors:  None    Findings Post-Intubation:  BS equal bilateral and atraumatic/condition of teeth unchanged

## 2022-10-18 NOTE — CONSULTS
Consult Note  Nephrology    Consult Requested By: Claude S. Williams IV, MD  Reason for Consult: HTN, Hypothyroidism, hypercholesterolemia    SUBJECTIVE:     History of Present Illness:  Patient is a 82 y.o. female presents with closed fracture of humerus and who underwent left reverse shoulder arthroplasty earlier today. Denies CP, Palps, SOB, Nausea, Vomitting.     Past Medical History:   Diagnosis Date    Arthritis     Hypertension     Thyroid disease      Past Surgical History:   Procedure Laterality Date    HEMORRHOID SURGERY  1985    MOUTH SURGERY      jaw realignment     History reviewed. No pertinent family history.  Social History     Tobacco Use    Smoking status: Never    Smokeless tobacco: Never   Substance Use Topics    Alcohol use: Yes     Comment: very rare    Drug use: Never       Medications Prior to Admission   Medication Sig Dispense Refill Last Dose    atorvastatin (LIPITOR) 10 MG tablet Take 10 mg by mouth once daily.   10/17/2022 at 2000    EScitalopram oxalate (LEXAPRO) 20 MG tablet TAKE 1 TABLET BY MOUTH EVERY DAY FOR MOOD OR ANXIETY   10/17/2022 at 0800    levothyroxine (SYNTHROID) 75 MCG tablet TAKE 1 TABLET BY MOUTH EVERY DAY ON AN EMPTY STOMACH FOR THYROID   10/18/2022    losartan (COZAAR) 50 MG tablet Take 50 mg by mouth once daily.   10/17/2022    meloxicam (MOBIC) 15 MG tablet Take 15 mg by mouth daily as needed.   10/17/2022    temazepam (RESTORIL) 15 mg Cap TAKE 2 CAPSULES BY MOUTH AT BEDTIME AS NEEDED   10/17/2022    traMADoL (ULTRAM) 50 mg tablet Take 50 mg by mouth every 6 to 8 hours as needed.   10/18/2022       Review of patient's allergies indicates:  No Known Allergies     Review of Systems:  Constitutional: No fever or chills.  Respiratory: No cough or shorness of breath  Cardiovascular: No chest pain or palpitations  Gastrointestinal: Good appetite, no nausea or vomiting, no change in bowel habits  Genitourinary: No hematuria or dysuria  Skin: No rash or  pruritis  Hematologic/lymphatic: No easy bruising, bleeding or lymphadenopathy  Musculoskeletal: No arthralgias or myalgias  Neurological: No seizures or tremors  Endocrine: No heat/cold intolerance.  No polyuria/polydipsia.  Psychiatric:  No depression or anxiety.     OBJECTIVE:     Vital Signs (Most Recent)  Temp: 98 °F (36.7 °C) (10/18/22 1523)  Pulse: 73 (10/18/22 1523)  Resp: 16 (10/18/22 1523)  BP: (!) 151/78 (10/18/22 1523)  SpO2: 96 % (10/18/22 1600)    Vital Signs Range (Last 24H):  Temp:  [96.4 °F (35.8 °C)-98 °F (36.7 °C)]   Pulse:  [62-80]   Resp:  [16]   BP: (135-157)/(63-96)   SpO2:  [96 %-99 %]     Physical Exam:    General appearance: Well developed, well nourished  Lungs: Clear to auscultation bilaterally and normal respiratory effort  Heart: Regular rate and rhythm, S1, S2 normal, no murmur, click, rub or gallop  Abdomen: Soft, non-tender non-distended; bowel sounds normal; no masses,  no organomegaly  Extremities: No cyanosis or clubbing. No edema, left arm in sling left hand neuvascularily intact  Pulses: 2+ and symmetric      Diagnostic Results:  Labs: Reviewed  ECG: Reviewed  X-Ray: Reviewed    ASSESSMENT/PLAN:     Closed fracture of left humerus s/p reverse shoulder arthroplasty  -per Ortho.    HTN  -Continue home meds with hold parameters    Hypercholesterolemia  -Continue statin.    Chronic Anemia  -Pre-op Hgb 10.2, recheck in am.    Hypothyroidism  -Continue synthroid    Depression  -Continue Lexapro     Thanks for consultation, will follow along with you.

## 2022-10-18 NOTE — NURSING
Nurses Note -- 4 Eyes      10/18/2022   6:29 PM      Skin assessed during: Admit      [] No Pressure Injuries Present    []Prevention Measures Documented      [] Yes- Altered Skin Integrity Present or Discovered   [] LDA Added if Not in Epic (Describe Wound)   [] New Altered Skin Integrity was Present on Admit and Documented in LDA   [] Wound Image Taken    Wound Care Consulted? No    Attending Nurse:  Gabby Lau RN     Second RN/Staff Member:  WINSOME Kennedy

## 2022-10-18 NOTE — PT/OT/SLP EVAL
Occupational Therapy   Evaluation and Treatment    Name: Sun Jones  MRN: [id number]  Admitting Diagnosis:  <principal problem not specified>  Recent Surgery: Procedure(s) (LRB):  ARTHROPLASTY, SHOULDER, TOTAL, REVERSE / LEFT (Left) Day of Surgery    Recommendations:     Discharge Recommendations:  TBD once pt progresses with therapy.  Anticipate pt will need private caregivers and Home Health OT and PT.  Daughter not available to provide assist pt is needing and  unable.  Discharge Equipment Recommendations:  bedside commode (Defer to PT for AD for ambulation.)  Barriers to discharge:   (Pt is needing caregiver training; current functional level.)    Assessment:     Sun Jones is a 82 y.o. female s/p left rTSA.  She presents alert and agreeable to participating in OT evaluation and tx session.  Performance deficits affecting function: weakness, impaired self care skills, impaired functional mobility, impaired sensation, impaired endurance, gait instability, impaired balance, decreased upper extremity function, decreased safety awareness, decreased coordination, edema, impaired skin, decreased ROM, impaired joint extensibility, orthopedic precautions, decreased lower extremity function.      Rehab Prognosis: Good; patient would benefit from acute skilled OT services to address these deficits and reach maximum level of function.       Plan:     Patient to be seen daily to address the above listed problems via self-care/home management, therapeutic activities, therapeutic exercises  Plan of Care Expires: 10/20/22  Plan of Care Reviewed with: patient    Subjective     Chief Complaint: None  Patient/Family Comments/goals: Pt reporting her daughter has been assisting her with dressing and bathing each morning lately.  Pt reporting she didn't realize that she would need assistance throughout the day with management of cryotherapy and immobilizer.  OT educated pt that her daughter needs to be trained in  "assisting her since her daughter is not with her all the time.    Occupational Profile:  Living Environment: Lives with  in two story home, bedroom on 2nd floor.  Pt's  unable to assist cognitively or physically.   Previous level of function: Mod I<>Min A; reporting she holds onto furniture when she walks; History of falls. Daughter was assisting her with bathing and dressing prior to surgery.  Equipment Used at Home:   (Pt reporting she "holds onto furniture" when she walks.)  Has SPC but hasn't used since last year.  Assistance upon Discharge: Daughter but not available all the time and does not live with pt.  Pt's  is not able to assist.     Pain/Comfort:  Pain Rating 1: 0/10  Location - Side 1: Left  Location 1: arm  Pain Addressed 1:  (Pt instructed to use call button to call for pain medication from nurse when needed.)  Pain Rating Post-Intervention 1: 0/10    Patients cultural, spiritual, Yarsanism conflicts given the current situation: no    Objective:     Communicated with: nurseGabby, prior to session.  Patient found  sitting on BSC  with peripheral IV, hemovac upon OT entry to room.    General Precautions: Standard, fall   Orthopedic Precautions:LUE non weight bearing   Braces: Shoulder abduction brace  Respiratory Status: Room air    Occupational Performance:    Bed Mobility:    Sit to supine: Max A    Functional Mobility/Transfers:  Sit to stand: Min A  BSC to bed: Min A without AD and HHA  Functional Mobility: No LOB but pt needing right UE support, unsteady; fall risk, pt is not safe to perform ADL mobility alone.    Activities of Daily Living:  Toileting: Mod A; urinary incontinence  UB Dressing: Max A  LB Dressing: Max A    Cognitive/Visual Perceptual:  Cognitive/Psychosocial Skills:     -       Oriented to: Person, Place, Time, and Situation   -       Follows Commands/attention:Follows one-step commands  -       Communication: clear/fluent  -       Memory: No Deficits " noted  -       Safety awareness/insight to disability: impaired   -       Mood/Affect/Coping skills/emotional control: Cooperative    Physical Exam:  Static Sitting Balance: SBA  Static Standing Balance: CGA   Dynamic Standing Balance for basic self care: Min A  Right UE AROM: WFL for self care  Sensation: Left UE numbness has not resolved from surgery  Edema: Left shoulder  Skin: Surgical incision under dressing of left shoulder, visible skin intact    LEFT UE HEP:  Pt requires physical assist with all therapeutic exercises.  Pendulum  Left elbow<>hand A/AAROM    AMPAC 6 Click ADL:  AMPAC Total Score: 14    Treatment & Education:  Role of OT, POC, left rTSA precautions and how to adhere to precautions during ADL and ADL mobility, donning/doffing and correct fit of left UE immobilizer and cryotherapy, left UE HEP and precautions, safety precautions with cryotherapy, discharge recs    Patient left HOB elevated with all lines intact, bilateral SCDs hooked to machine but not working, call button in reach, bed alarm on, and nurseGabby present  Nurse notified that pt's SCD machine is not working correctly.       GOALS:   Multidisciplinary Problems       Occupational Therapy Goals          Problem: Occupational Therapy    Goal Priority Disciplines Outcome Interventions   Occupational Therapy Goal     OT, PT/OT Ongoing, Progressing    Description: Goals to be met by: 10/20/22    Patient will increase functional independence with ADLs by performing:       Pt and caregiver will verbalize understanding of pt's left UE precautions and how to adhere to pt's precautions during ADL and ADL mobility.    Pt and caregiver will verbalize and demonstrate understanding of how cryotherapy fits on pt's left UE acknowledging precautions and purpose of cryotherapy.     Pt and caregiver will verbalize and demonstrate understanding of correct positioning of pt's left UE in immobilizer and how to position left UE when lying in bed/sitting in  chair with immobilizer donned.    Pt and caregiver will verbalize and demonstrate understanding of pt's left UE HEP adhering to precautions.  Pt and caregiver will verbalize and demonstrate understanding of safety strategies to reduce pt fall risk during ADL and ADL mobility.                              History:     Past Medical History:   Diagnosis Date    Arthritis     Hypertension     Thyroid disease          Past Surgical History:   Procedure Laterality Date    HEMORRHOID SURGERY  1985    MOUTH SURGERY      jaw realignment       Time Tracking:     OT Date of Treatment: 10/18/22  OT Start Time: 1730  OT Stop Time: 1822  OT Total Time (min): 52 min    Billable Minutes:Evaluation 10  Self Care/Home Management 30  Therapeutic Exercise 12    10/18/2022

## 2022-10-18 NOTE — ANESTHESIA POSTPROCEDURE EVALUATION
Anesthesia Post Evaluation    Patient: Sun Jones    Procedure(s) Performed: Procedure(s) (LRB):  ARTHROPLASTY, SHOULDER, TOTAL, REVERSE / LEFT (Left)    Final Anesthesia Type: general      Patient location during evaluation: PACU  Patient participation: Yes- Able to Participate  Level of consciousness: awake and alert  Post-procedure vital signs: reviewed and stable  Pain management: adequate  Airway patency: patent    PONV status at discharge: No PONV  Anesthetic complications: no      Cardiovascular status: blood pressure returned to baseline  Respiratory status: unassisted  Hydration status: euvolemic  Follow-up not needed.          Vitals Value Taken Time   /72 10/18/22 1450   Temp 36.4 °C (97.5 °F) 10/18/22 1354   Pulse 63 10/18/22 1451   Resp 16 10/18/22 1430   SpO2 95 % 10/18/22 1451   Vitals shown include unvalidated device data.      No case tracking events are documented in the log.      Pain/Mal Score: Pain Rating Prior to Med Admin: 4 (10/18/2022  2:12 PM)  Mal Score: 9 (10/18/2022  2:46 PM)

## 2022-10-19 VITALS
HEART RATE: 92 BPM | DIASTOLIC BLOOD PRESSURE: 88 MMHG | BODY MASS INDEX: 35.7 KG/M2 | SYSTOLIC BLOOD PRESSURE: 172 MMHG | OXYGEN SATURATION: 97 % | HEIGHT: 62 IN | RESPIRATION RATE: 16 BRPM | TEMPERATURE: 98 F | WEIGHT: 194 LBS

## 2022-10-19 PROCEDURE — 27000221 HC OXYGEN, UP TO 24 HOURS

## 2022-10-19 PROCEDURE — 94761 N-INVAS EAR/PLS OXIMETRY MLT: CPT

## 2022-10-19 PROCEDURE — 99900035 HC TECH TIME PER 15 MIN (STAT)

## 2022-10-19 PROCEDURE — 97110 THERAPEUTIC EXERCISES: CPT

## 2022-10-19 PROCEDURE — 97535 SELF CARE MNGMENT TRAINING: CPT

## 2022-10-19 PROCEDURE — 97116 GAIT TRAINING THERAPY: CPT

## 2022-10-19 PROCEDURE — 25000003 PHARM REV CODE 250: Performed by: INTERNAL MEDICINE

## 2022-10-19 PROCEDURE — 25000003 PHARM REV CODE 250: Performed by: ORTHOPAEDIC SURGERY

## 2022-10-19 PROCEDURE — 63600175 PHARM REV CODE 636 W HCPCS: Performed by: ORTHOPAEDIC SURGERY

## 2022-10-19 PROCEDURE — 97162 PT EVAL MOD COMPLEX 30 MIN: CPT

## 2022-10-19 PROCEDURE — 25000003 PHARM REV CODE 250: Performed by: NURSE PRACTITIONER

## 2022-10-19 RX ORDER — AMLODIPINE BESYLATE 5 MG/1
5 TABLET ORAL ONCE
Status: COMPLETED | OUTPATIENT
Start: 2022-10-19 | End: 2022-10-19

## 2022-10-19 RX ORDER — LOSARTAN POTASSIUM 50 MG/1
50 TABLET ORAL DAILY
Status: DISCONTINUED | OUTPATIENT
Start: 2022-10-19 | End: 2022-10-19 | Stop reason: HOSPADM

## 2022-10-19 RX ORDER — HYDROCODONE BITARTRATE AND ACETAMINOPHEN 5; 325 MG/1; MG/1
1 TABLET ORAL EVERY 4 HOURS PRN
Qty: 30 TABLET | Refills: 0 | Status: SHIPPED | OUTPATIENT
Start: 2022-10-19

## 2022-10-19 RX ADMIN — LEVOTHYROXINE SODIUM 75 MCG: 75 TABLET ORAL at 05:10

## 2022-10-19 RX ADMIN — FAMOTIDINE 20 MG: 20 TABLET ORAL at 08:10

## 2022-10-19 RX ADMIN — MUPIROCIN 1 G: 20 OINTMENT TOPICAL at 08:10

## 2022-10-19 RX ADMIN — POLYETHYLENE GLYCOL 3350 17 G: 17 POWDER, FOR SOLUTION ORAL at 08:10

## 2022-10-19 RX ADMIN — ACETAMINOPHEN 1000 MG: 500 TABLET ORAL at 05:10

## 2022-10-19 RX ADMIN — ESCITALOPRAM OXALATE 20 MG: 10 TABLET ORAL at 08:10

## 2022-10-19 RX ADMIN — AMLODIPINE BESYLATE 5 MG: 5 TABLET ORAL at 09:10

## 2022-10-19 RX ADMIN — SENNOSIDES AND DOCUSATE SODIUM 1 TABLET: 50; 8.6 TABLET ORAL at 08:10

## 2022-10-19 RX ADMIN — CEFAZOLIN SODIUM 2 G: 2 SOLUTION INTRAVENOUS at 05:10

## 2022-10-19 RX ADMIN — HYDROCODONE BITARTRATE AND ACETAMINOPHEN 1 TABLET: 10; 325 TABLET ORAL at 11:10

## 2022-10-19 RX ADMIN — LOSARTAN POTASSIUM 50 MG: 50 TABLET, FILM COATED ORAL at 07:10

## 2022-10-19 RX ADMIN — ATORVASTATIN CALCIUM 10 MG: 10 TABLET, FILM COATED ORAL at 08:10

## 2022-10-19 NOTE — PLAN OF CARE
10/19/22 1223   Final Note   Assessment Type Final Discharge Note   Anticipated Discharge Disposition Home-Health   Hospital Resources/Appts/Education Provided Provided patient/caregiver with written discharge plan information;Appointments scheduled and added to AVS;Appointments scheduled by Navigator/Coordinator   Post-Acute Status   Post-Acute Authorization Home Health   Home Health Status Set-up Complete/Auth obtained   Discharge Delays None known at this time       Pt states she lives independently at home with her .     Family will provide transportation home.    Patient home health orders sent to [facility] for assignment.      All d/c needs addressed from a CM perspective.

## 2022-10-19 NOTE — PT/OT/SLP EVAL
Physical Therapy Evaluation and Treatment    Patient Name:  Sun Jones   MRN:  [id number]    Recommendations:     Discharge Recommendations:   (Anticipate home with 24/7 assist and Home Health)   Discharge Equipment Recommendations: bedside commode, cane, quad (QC vs using hurri-cane)   Barriers to discharge: Inaccessible home and Decreased caregiver support    Assessment:     Sun Jones is a 82 y.o. female admitted with a medical diagnosis of L humeral fracture after fall down stairs now s/p TSA. Pt reports multiple falls for variety of reasons, tendency to fall backwards. Pmhx significant for arthritis and HTN.  She presents with the following impairments/functional limitations:  weakness, impaired self care skills, impaired functional mobility, gait instability, impaired balance, decreased upper extremity function, decreased safety awareness, decreased ROM, impaired skin, edema, orthopedic precautions.    Patient evaluated by PT and goals established. Patient with generalized imapired balance with decreased safety awareness with multiple LOB during functional mobility requiring close SBA with CGA to prevent fall. Ascended/descend stairs with uni HR with SBA without overt difficulty. Patient presents as high fall risk requiring continuous supervision while performing OOB mobility. PT will continue to follow and progress as tolerated. Rec for dc to environment to provide 24/7 supervision with continued PT and OT.    Rehab Prognosis: Good; patient would benefit from acute skilled PT services to address these deficits and reach maximum level of function.    Recent Surgery: Procedure(s) (LRB):  ARTHROPLASTY, SHOULDER, TOTAL, REVERSE (Left) 1 Day Post-Op    Plan:     During this hospitalization, patient to be seen 6 x/week to address the identified rehab impairments via gait training, therapeutic activities, therapeutic exercises, neuromuscular re-education and progress toward the following goals:    Plan of  Care Expires:  10/26/22    Subjective     Chief Complaint: No significant pain in shoulder, concerned about managing her sling/cryotherapy  Patient/Family Comments/goals: Goal to return home; Patient agreeable to evaluation.  Pain/Comfort:  Pain Rating 1: 0/10  Location - Side 1: Left  Location 1: arm  Pain Addressed 1: Reposition, Distraction, Cessation of Activity  Pain Rating Post-Intervention 1: 0/10    Patients cultural, spiritual, Taoism conflicts given the current situation: no    Living Environment:  Pt lives with her  in a 2 story home   Bedroom on 2nd floor, B HR  Upon discharge, patient will have assistance from her  (limited physical assistance d/t reported dementia).  Prior level of function:  Ambulation: Indep - reports furniture cruising and occasional hurri-cane  ADL's: Indep  IADLs: Indep  Falls: multiple falls, including fall down stairs resulting in humeral fracture requiring surgery   Equipment used at home: cane, straight (hurri-cane).      Objective:     Communicated with ELIJAH Pierre prior to session.  Patient found HOB elevated with peripheral IV, cryotherapy, PureWick, SCD  upon PT entry to room.    General Precautions: Standard, fall   Orthopedic Precautions:LUE non weight bearing (no shoulder AROM, no ER, no shoulder aBD)   Braces: Shoulder abduction brace  Respiratory Status: Room air    Patient donned non slip socks for OOB mobility.     Exams:  Cognition:   Patient is oriented to name, , date, place, situation.  Pt follows approximately 100% of one step commands.   Mood: Pleasant and cooperative.   Musculoskeletal:  Posture: Protective guarding surgical joint with extremity in shoulder abduction brace  LE ROM/Strength:   ROM: WFL  Strength: WFL  Neuromuscular:  Sensation: Intact to light touch bilateral LEs. Pt denied paresthesias.   Coordination/Tone/Reflexes: No impairments identified with functional mobility. No formal testing performed.   Balance: CGA-SBA for dynamic  standing balance with uni UE support  Visual-vestibular: No impairments identified with functional mobility. No formal testing performed.  Integument:  Visible skin intact and surgical extremity dressing clean and dry.   Cardiopulmonary:  Vital signs: Elevated BP, seeing flowsheet  Edema: None noted BLE    Functional Mobility:  Bed Mobility:     Supine to Sit: moderate assistance- pt with near fall out of bed d/t mis-measuring size of bed requiring assistance to prevent fall  Transfers:     Sit to Stand:  contact guard assistance with hand-held assist  From EOB, chair  Gait: 2x50 ft with QC, 1x20 ft with SPC with CGA-SBA.   With slow gait speed, occasional irregular foot placement with cross over steps to maintain balance.   With increased gait speed, pt with increased gait instability requiring hands on assistance.   Stairs:  Pt ascended/descended 8 stair(s) with No Assistive Device with HR with RUE with Stand-by Assistance and Contact Guard Assistance.   Step to pattern, decreased anterior weight shift with descending      AM-PAC 6 CLICK MOBILITY  Total Score:18       Treatment & Education:  Gait:  Cueing for use of QC vs hurricane  Cueing for slower gait speed for increased B stance time to maximize safety with standing mobility  PT educated patient re:   PT plan of care/role of PT  Safety with OOB mobility  Use of cane  Positioning of UE and use of ice  Orthopedic precautions  Gait deviations  Discharge disposition    Pt verbalized understanding       Patient left up in chair with all lines intact, call button in reach, and RN Gabby notified.    GOALS:   Multidisciplinary Problems       Physical Therapy Goals          Problem: Physical Therapy    Goal Priority Disciplines Outcome Goal Variances Interventions   Physical Therapy Goal     PT, PT/OT Ongoing, Progressing     Description: Goals to be met by: 10/26/22    Patient will increase functional independence with mobility by performin. Supine<>sit with  HOB flat and use of head board with supervision.  2. Sit<>stand with SPC with supervision.  3. Gait x 100 feet with supervision with SPC.  4. Ascend/descend 10 step(s) with uni HR and supervision.                        History:     Past Medical History:   Diagnosis Date    Arthritis     Hypertension     Thyroid disease        Past Surgical History:   Procedure Laterality Date    HEMORRHOID SURGERY  1985    MOUTH SURGERY      jaw realignment    REVERSE TOTAL SHOULDER ARTHROPLASTY Left 10/18/2022    Procedure: ARTHROPLASTY, SHOULDER, TOTAL, REVERSE;  Surgeon: Claude S. Williams IV, MD;  Location: [facility];  Service: Orthopedics;  Laterality: Left;       Time Tracking:     PT Received On: 10/19/22  PT Start Time: 1019     PT Stop Time: 1050  PT Total Time (min): 31 min     Billable Minutes: Evaluation 11 and Gait Training 15  TA 5 min unbilled addressing brace fitting/use of ice      10/19/2022

## 2022-10-19 NOTE — PLAN OF CARE
Problem: Physical Therapy  Goal: Physical Therapy Goal  Description: Goals to be met by: 10/26/22    Patient will increase functional independence with mobility by performin. Supine<>sit with HOB flat and use of head board with supervision.  2. Sit<>stand with SPC with supervision.  3. Gait x 100 feet with supervision with SPC.  4. Ascend/descend 10 step(s) with uni HR and supervision.   Outcome: Ongoing, Progressing     Patient evaluated by PT and goals established. Patient with generalized imapired balance with decreased safety awareness with multiple LOB during functional mobility requiring close SBA with CGA to prevent fall. Ascended/descend stairs with uni HR with SBA without overt difficulty. Patient presents as high fall risk requiring continuous supervision while performing OOB mobility. PT will continue to follow and progress as tolerated. Rec for dc to environment to provide 24/7 supervision with continued PT and OT. Please see progress note for detailed plan of care and recommendations.

## 2022-10-19 NOTE — PLAN OF CARE
[facility] ([facility])      HOME HEALTH ORDERS  FACE TO FACE ENCOUNTER    Patient Name: Sun Jones  YOB: 1940    PCP: Primary Doctor No   PCP Address: None  PCP Phone Number: None  PCP Fax: None    Encounter Date: 10/7/22    Admit to Home Health    Diagnoses:    ARTHROPLASTY, SHOULDER, TOTAL, REVERSE / LEFT (Left)    Follow Up Appointments:  No future appointments.    Allergies:Review of patient's allergies indicates:  No Known Allergies    Medications: Review discharge medications with patient and family and provide education.      Current Discharge Medication List        START taking these medications    Details   HYDROcodone-acetaminophen (NORCO) 5-325 mg per tablet Take 1 tablet by mouth every 4 (four) hours as needed for Pain.  Qty: 30 tablet, Refills: 0    Comments: Quantity prescribed more than 7 day supply? No           CONTINUE these medications which have NOT CHANGED    Details   atorvastatin (LIPITOR) 10 MG tablet Take 10 mg by mouth once daily.      EScitalopram oxalate (LEXAPRO) 20 MG tablet TAKE 1 TABLET BY MOUTH EVERY DAY FOR MOOD OR ANXIETY      levothyroxine (SYNTHROID) 75 MCG tablet TAKE 1 TABLET BY MOUTH EVERY DAY ON AN EMPTY STOMACH FOR THYROID      losartan (COZAAR) 50 MG tablet Take 50 mg by mouth once daily.      meloxicam (MOBIC) 15 MG tablet Take 15 mg by mouth daily as needed.      temazepam (RESTORIL) 15 mg Cap TAKE 2 CAPSULES BY MOUTH AT BEDTIME AS NEEDED           STOP taking these medications       traMADoL (ULTRAM) 50 mg tablet Comments:   Reason for Stopping:                 I have seen and examined this patient within the last 30 days. My clinical findings that support the need for the home health skilled services and home bound status are the following:no   Medical restrictions requiring assistance of another human to leave home due to  recent surgery.     Diet:   regular diet        Referrals/ Consults  Physical Therapy to evaluate and treat. Evaluate  for home safety and equipment needs; Establish/upgrade home exercise program. Perform / instruct on therapeutic exercises, gait training, transfer training, and Range of Motion.  Occupational Therapy to evaluate and treat. Evaluate home environment for safety and equipment needs. Perform/Instruct on transfers, ADL training, ROM, and therapeutic exercises.    Activities:   NWB Left shoulder. no abduction, no external rotation, non weight bearing in the operative arm, no active ROM on shoulder. Assistance with gentle pendulum exercises, active assisted  elbow wrist and hand motion  No passive external rotation or active elevation.    Nursing:   Agency to admit patient within 24  of hospital discharge unless specified on physician order or at patient request    SN to complete comprehensive assessment including routine vital signs. Instruct on disease process and s/s of complications to report to MD. Review/verify medication list sent home with the patient at time of discharge  and instruct patient/caregiver as needed. Frequency may be adjusted depending on start of care date.     Skilled nurse to perform up to 3 visits PRN for symptoms related to diagnosis    Notify MD if SBP > 160 or < 90; DBP > 90 or < 50; HR > 120 or < 50; Temp > 101; O2 < 88%;    Ok to schedule additional visits based on staff availability and patient request on consecutive days within the home health episode.    When multiple disciplines ordered:    Start of Care occurs on Sunday - Wednesday schedule remaining discipline evaluations as ordered on separate consecutive days following the start of care.    Thursday SOC -schedule subsequent evaluations Friday and Monday the following week.     Friday - Saturday SOC - schedule subsequent discipline evaluations on consecutive days starting Monday of the following week.    For all post-discharge communication and subsequent orders please contact patient's primary surgeon, Dr Claude Williams      Home  Health Aide:  Nursing Three times weekly, Physical Therapy Three times weekly, and Occupational Therapy Three times weekly    Wound Care Orders  no    I certify that this patient is confined to her home and needs intermittent skilled nursing care, physical therapy, and occupational therapy.

## 2022-10-19 NOTE — PROGRESS NOTES
"Nephrology  Progress Note    Admit Date: 10/18/2022   LOS: 0 days     SUBJECTIVE:     Follow-up For:  shoulder    Interval History:     issues with BP noted.  Pt states that she usually has white coat syndrome at MD appts.  Gave losartan early and will give dose of norvasc X 1.  Denies pain/anxiety/CP/SOB/Calf pain.  Discussed with RN.     Review of Systems:  Constitutional: No fever or chills  Respiratory: No cough or shortness of breath  Cardiovascular: No chest pain or palpitations  Gastrointestinal: No nausea or vomiting  Neurological: No confusion or weakness    OBJECTIVE:     Vital Signs Range (Last 24H):  BP (!) 196/91 (BP Location: Right arm, Patient Position: Lying)   Pulse 77   Temp 98.1 °F (36.7 °C) (Oral)   Resp 18   Ht 5' 2" (1.575 m)   Wt 88 kg (194 lb)   SpO2 97%   Breastfeeding No   BMI 35.48 kg/m²     Temp:  [96.4 °F (35.8 °C)-98.1 °F (36.7 °C)]   Pulse:  [62-88]   Resp:  [16-18]   BP: (135-198)/(63-96)   SpO2:  [91 %-99 %]     I & O (Last 24H):  Intake/Output Summary (Last 24 hours) at 10/19/2022 0951  Last data filed at 10/18/2022 2126  Gross per 24 hour   Intake 2240 ml   Output 630 ml   Net 1610 ml       Physical Exam:  General appearance: Well developed, well nourished  Eyes:  Conjunctivae/corneas clear. PERRL.  Lungs: Normal respiratory effort,   clear to auscultation bilaterally   Heart: Regular rate and rhythm, S1, S2 normal, no murmur, rub or shaquille.  Abdomen: Soft, non-tender non-distended; bowel sounds normal; no masses,  no organomegaly  Extremities: No cyanosis or clubbing. No edema.    Skin: Skin color, texture, turgor normal. No rashes or lesions  Neurologic: Normal strength and tone. No focal numbness or weakness   Left arm sling    Laboratory Data:  Recent Labs   Lab 10/18/22  1056   WBC 6.86   RBC 3.45*   HGB 10.2*   HCT 30.8*      MCV 89   MCH 29.6   MCHC 33.1       BMP: No results for input(s): GLU, NA, K, CL, CO2, BUN, CREATININE, CALCIUM, MG in the last 168 " hours.    Invalid input(s):  PHOS  No results found for: CALCIUM, PHOS    No results found for: PTH, CALCIUM, CAION, PHOS    No results found for: URICACID    BNP  No results for input(s): BNP, BNPTRIAGEBLO in the last 168 hours.    Medications:  Medication list was reviewed and changes noted under Assessment/Plan.    Diagnostic Results:        ASSESSMENT/PLAN:     Closed fracture of left humerus s/p reverse shoulder arthroplasty  -per Ortho.     HTN  -events noted as mentioned in HPI.  White coat syndrome.  No pain issues.  Dose norvasc X 1 to allow therapy.  F/up.  Gave losartan earlier.      Hypercholesterolemia  -Continue statin.     Chronic Anemia  -10.2 pre op.   -needs f/up with PCP.      Hypothyroidism  -Continue synthroid     Depression  -Continue Lexapro      As above  Home later if BP better.

## 2022-10-19 NOTE — PLAN OF CARE
Problem: Adult Inpatient Plan of Care  Goal: Optimal Comfort and Wellbeing  Outcome: Ongoing, Progressing     Problem: Adjustment to Surgery (Shoulder Arthroplasty)  Goal: Optimal Coping  Outcome: Ongoing, Progressing     Problem: Functional Ability Impaired (Shoulder Arthroplasty)  Goal: Optimal Functional Ability  Outcome: Ongoing, Progressing     Problem: Infection (Shoulder Arthroplasty)  Goal: Absence of Infection Signs and Symptoms  Outcome: Ongoing, Progressing     Problem: Pain (Shoulder Arthroplasty)  Goal: Acceptable Pain Control  Outcome: Ongoing, Progressing

## 2022-10-19 NOTE — PLAN OF CARE
Cm met with the patient at the bedside. Patients daughter is also at the bedside.     Patient is alert and oriented with no communication barriers.     Patient denies having DME at home.     Patients PCP is correct on the face sheet. Patient choice pharmacy is bedside delivery.     Patients family will transport the patient home at discharge.     CM team will continue to follow.        10/19/22 1218   Discharge Assessment   Assessment Type Discharge Planning Assessment   Confirmed/corrected address, phone number and insurance Yes   Confirmed Demographics Correct on Facesheet   Source of Information patient;family;health record   Lives With alone   Do you expect to return to your current living situation? No   Do you have help at home or someone to help you manage your care at home? Yes   Prior to hospitilization cognitive status: Alert/Oriented   Current cognitive status: Alert/Oriented   Walking or Climbing Stairs Difficulty none   Dressing/Bathing Difficulty none   Equipment Currently Used at Home none   Readmission within 30 days? No   Patient currently being followed by outpatient case management? No   Do you currently have service(s) that help you manage your care at home? Yes   Is the pt/caregiver preference to resume services with current agency No   Do you take prescription medications? Yes   Do you have prescription coverage? Yes   Do you have any problems affording any of your prescribed medications? No   Is the patient taking medications as prescribed? yes   How do you get to doctors appointments? family or friend will provide   Are you on dialysis? No   Do you take coumadin? No   Discharge Plan A Home Health   Discharge Plan B Rehab   DME Needed Upon Discharge  none   Discharge Plan discussed with: Patient;Adult children   Discharge Barriers Identified None   Physical Activity   On average, how many days per week do you engage in moderate to strenuous exercise (like a brisk walk)? 0 days   On average,  how many minutes do you engage in exercise at this level? 0 min   Financial Resource Strain   How hard is it for you to pay for the very basics like food, housing, medical care, and heating? Not hard   Housing Stability   In the last 12 months, was there a time when you were not able to pay the mortgage or rent on time? N   In the last 12 months, was there a time when you did not have a steady place to sleep or slept in a shelter (including now)? N   Transportation Needs   In the past 12 months, has lack of transportation kept you from medical appointments or from getting medications? no   In the past 12 months, has lack of transportation kept you from meetings, work, or from getting things needed for daily living? No   Food Insecurity   Within the past 12 months, you worried that your food would run out before you got the money to buy more. Never true   Within the past 12 months, the food you bought just didn't last and you didn't have money to get more. Never true   Stress   Do you feel stress - tense, restless, nervous, or anxious, or unable to sleep at night because your mind is troubled all the time - these days? Not at all   Social Connections   In a typical week, how many times do you talk on the phone with family, friends, or neighbors? Three   How often do you get together with friends or relatives? Three times   How often do you attend Islam or Mosque services? Never   Do you belong to any clubs or organizations such as Islam groups, unions, fraternal or athletic groups, or school groups? Yes   How often do you attend meetings of the clubs or organizations you belong to? Never   Are you , , , , never , or living with a partner?    Alcohol Use   Q1: How often do you have a drink containing alcohol? Never   Q2: How many drinks containing alcohol do you have on a typical day when you are drinking? None   Q3: How often do you have six or more drinks on one  occasion? Never

## 2022-10-19 NOTE — NURSING
Reviewed AVS with patient and family. Home health to begin after discharge. [facility] ice sent home with patient.

## 2022-10-19 NOTE — PT/OT/SLP PROGRESS
Physical Therapy  Not Seen    Patient Name:  Sun Jones   MRN:  [id number]    Patient not seen today secondary to Other (Comment) (starting to eat breakfast). Will follow-up later in AM.

## 2022-10-20 NOTE — PLAN OF CARE
Problem: Occupational Therapy  Goal: Occupational Therapy Goal  Description: Goals to be met by: 10/20/22    Patient will increase functional independence with ADLs by performing:       Pt and caregiver will verbalize understanding of pt's left UE precautions and how to adhere to pt's precautions during ADL and ADL mobility.    Pt and caregiver will verbalize and demonstrate understanding of how cryotherapy fits on pt's left UE acknowledging precautions and purpose of cryotherapy.     Pt and caregiver will verbalize and demonstrate understanding of correct positioning of pt's left UE in immobilizer and how to position left UE when lying in bed/sitting in chair with immobilizer donned.    Pt and caregiver will verbalize and demonstrate understanding of pt's left UE HEP adhering to precautions.  Pt and caregiver will verbalize and demonstrate understanding of safety strategies to reduce pt fall risk during ADL and ADL mobility.         Outcome: Ongoing, Progressing   Pt and daughter educated multiple times during tx session that pt is needing assist each time she performs ADL, ADL and HEP.   Pt and daughter with decreased insight into pt's deficits despite continued education.  Pt is a fall risk and if pt's daughter unable to be with pt and  not able to physically assist pt with these tasks, recommend private caregivers to reduce pt fall risk.  Pt's daughter and/or private caregiver needs to participate in Home Health therapy sessions to assist pt with left UE HEP she needs to perform 3 times a day for 5 minutes each time.  Education and training on pt's left UE precautions and how to adhere to precautions during ADL and ADL mobility, correct fit and doffing/donning of cryotherapy and shoulder immobilizer, safety strategies to reduce pt fall risk and adaptive techniques for pt's UB dressing and bathing.  Pt and daughter not demonstrating Indep and needing continued OT and PT to address pt's ADL, HEP and  functional mobility.  Recommend private caregiver since pt will not have assist from daughter and  as recommended.

## 2022-10-20 NOTE — PT/OT/SLP PROGRESS
"Occupational Therapy   Treatment and Discharge    Name: Sun Jones  MRN: [id number]  Admitting Diagnosis:  <principal problem not specified>  2 Days Post-Op    Recommendations:     Discharge Recommendations: home health OT, home health PT (24/7 assist; Assist needed for all standing, transfers and ambulation due to pt being a fall risk and unsteady.)  Discharge Equipment Recommendations:  bedside commode, cane, quad  Barriers to discharge:   (Pt and daughter educated on need for pt to have assist during all functional mobility but pt and daughter expressing that they will "see how pt does" when she returns home.  Pt and daughter educated on pt being a fall risk and should not transfer alone.)    Assessment:     Sun Jones is a 82 y.o. female s/p left rTSA and NWB to left UE.  She presents alert and daughter present for pt and caregiver education and training on pt's left shoulder precautions, how to adhere to precautions during ADL and ADL mobility, safety strategies to reduce pt fall risk, doffing/donning and correct fit of cryotherapy and shoulder immobilizer, precautions with use of cryotherapy, adaptive techniques for UB dressing, and left UE HEP.  Performance deficits affecting function are decreased upper extremity function, decreased ROM, impaired joint extensibility, orthopedic precautions, pain, impaired skin, edema, impaired functional mobility, impaired self care skills, decreased coordination, impaired balance, gait instability, weakness, impaired endurance.     Rehab Prognosis:  Good as long as pt has the assist she needs to decrease fall risk; patient would benefit from acute skilled OT services to address these deficits and reach maximum level of function.       Plan:     Patient to be seen daily to address the above listed problems via self-care/home management, therapeutic activities, therapeutic exercises  Plan of Care Expires: 10/19/22  Plan of Care Reviewed with: patient, " daughter    Subjective     Pain/Comfort:   Pt not rating pain.  Pain not interfering with tx session and training.     Objective:     Communicated with: nurse prior to session.  Patient found up in chair with peripheral IV, cryotherapy upon OT entry to room.    General Precautions: Standard, fall   Orthopedic Precautions:LUE non weight bearing (Left Shoulder - no shoulder AROM, no ER, no shoulder ABD)   Braces:    Respiratory Status: Room air     Occupational Performance:     Bed Mobility:    NT     Functional Mobility/Transfers:  Sit to stand: CGA  Transfers: CGA<>Min A for safety and due to being unsteady at times  Functional Mobility: Pt and daughter educated on need for pt to have hands-on assist during all functional mobility but pt and daughter reporting they will see how pt does once she gets home.  Continuous education on pt not safe to transfer alone, even if going to bathroom.  Recommended private caregivers if daughter unable to assist pt as needed but pt and daughter with decreased insight into pt deficits despite education.     Activities of Daily Living:  UB Dressing: Max A; pt and daughter educated and trained with OT demonstration first with pt then daughter and pt performing with SBA from OT  LB Dressing: Max A; educated on safe footwear during the day and wearing  socks over compression stockings when in bed; pt to sit during LB dressing as caregiver/daughter assists her  Toileting: Mod A    Left UE HEP: Pendulum and AROM shoulder shrugs, scapular retraction, elbow flexion/extension, forearm supination/pronation, wrist flexion/extension and finger flexion/extension and ball squeezes; Pt and OT demonstration with education and training of pt and daughter, written handouts for future reference.  Daughter/caregiver need to participate in pt's Home Health therapy sessions to gain confidence with assisting pt three times a day with her HEP.    [facility] 6 Click ADL:  14    Treatment & Education:  Role  of OT, POC, pt's UE precautions & how to adhere to precautions during ADL and ADL mobility, correct donning/doffing and fit of shoulder immobilizer & cryotherapy with precautions, adpative techniques for UB dressing and bathing, left UE HEP, safety    Patient left up in chair with all lines intact, call button in reach, nurse notified, and daughter present    GOALS:   Multidisciplinary Problems       Occupational Therapy Goals          Problem: Occupational Therapy    Goal Priority Disciplines Outcome Interventions   Occupational Therapy Goal     OT, PT/OT Ongoing, Progressing    Description: Goals to be met by: 10/20/22    Patient will increase functional independence with ADLs by performing:       Pt and caregiver will verbalize understanding of pt's left UE precautions and how to adhere to pt's precautions during ADL and ADL mobility.    Pt and caregiver will verbalize and demonstrate understanding of how cryotherapy fits on pt's left UE acknowledging precautions and purpose of cryotherapy.     Pt and caregiver will verbalize and demonstrate understanding of correct positioning of pt's left UE in immobilizer and how to position left UE when lying in bed/sitting in chair with immobilizer donned.    Pt and caregiver will verbalize and demonstrate understanding of pt's left UE HEP adhering to precautions.  Pt and caregiver will verbalize and demonstrate understanding of safety strategies to reduce pt fall risk during ADL and ADL mobility.                              Time Tracking:     OT Date of Treatment: 10/19/22  OT Start Time: 1219  OT Stop Time: 1320  OT Total Time (min): 61 min    Billable Minutes:Self Care/Home Management 41  Therapeutic Exercise 20    OT/LORENA: OT          10/20/2022

## 2022-10-25 PROCEDURE — G0180 MD CERTIFICATION HHA PATIENT: HCPCS | Mod: ,,, | Performed by: INTERNAL MEDICINE

## 2022-10-25 PROCEDURE — G0180 PR HOME HEALTH MD CERTIFICATION: ICD-10-PCS | Mod: ,,, | Performed by: INTERNAL MEDICINE

## 2022-11-03 ENCOUNTER — DOCUMENT SCAN (OUTPATIENT)
Dept: HOME HEALTH SERVICES | Facility: HOSPITAL | Age: 82
End: 2022-11-03
Payer: MEDICARE

## 2022-11-17 ENCOUNTER — EXTERNAL HOME HEALTH (OUTPATIENT)
Dept: HOME HEALTH SERVICES | Facility: HOSPITAL | Age: 82
End: 2022-11-17
Payer: MEDICARE

## 2022-11-17 ENCOUNTER — DOCUMENT SCAN (OUTPATIENT)
Dept: HOME HEALTH SERVICES | Facility: HOSPITAL | Age: 82
End: 2022-11-17
Payer: MEDICARE

## (undated) DEVICE — SPONGE LAP 18X18 PREWASHED

## (undated) DEVICE — COVER MAYO STAND REINFRCD 30

## (undated) DEVICE — NDL SAFETY 22G X 1.5 ECLIPSE

## (undated) DEVICE — PRESSURIZER BN CEMENT NOZZLE

## (undated) DEVICE — BNDG COFLEX FOAM LF2 ST 6X5YD

## (undated) DEVICE — POSITIONER IV ARMBOARD FOAM

## (undated) DEVICE — TIP YANKAUERS BULB NO VENT

## (undated) DEVICE — BLADE SAG DUAL 18MMX1.27MMX90M

## (undated) DEVICE — DRAPE U SPLIT SHEET 54X76IN

## (undated) DEVICE — PAD SHOULDER POLAR CARE XL

## (undated) DEVICE — SUT ETHIBOND EXCEL 2 V37 30

## (undated) DEVICE — SUPPORT SLING SHOT II MEDIUM

## (undated) DEVICE — 4.0 DRILL BIT

## (undated) DEVICE — KIT TOTAL HIP OPTIVAC

## (undated) DEVICE — SOL ALCOHOL ISO 70% WNTGR 16OZ

## (undated) DEVICE — STAPLER SKIN PROXIMATE WIDE

## (undated) DEVICE — DRAPE THREE-QTR REINF 53X77IN

## (undated) DEVICE — SOL NACL IRR 1000ML BTL

## (undated) DEVICE — DRAPE STERI U-SHAPED 47X51IN

## (undated) DEVICE — BNDG COFLEX FOAM LF2 ST 4X5YD

## (undated) DEVICE — SYR 50ML CATH TIP

## (undated) DEVICE — APPLICATOR CHLORAPREP ORN 26ML

## (undated) DEVICE — ELECTRODE REM PLYHSV RETURN 9

## (undated) DEVICE — UNDERGLOVES BIOGEL PI SIZE 8

## (undated) DEVICE — BLANKET HYPER ADULT 24X60IN

## (undated) DEVICE — DRESSING AQUACEL AG 3.5X10IN

## (undated) DEVICE — DRAPE SURG W/TWL 17 5/8X23

## (undated) DEVICE — COVER BACK TBL HD 2-TIER 72IN

## (undated) DEVICE — Device

## (undated) DEVICE — NDL MAYO CAT 1/2 CIR #6

## (undated) DEVICE — KIT EVACUATOR 3-SPRING 1/8 DRN

## (undated) DEVICE — GLOVE BIOGEL SKINSENSE PI 7.5